# Patient Record
Sex: FEMALE | Race: WHITE | NOT HISPANIC OR LATINO | Employment: FULL TIME | URBAN - METROPOLITAN AREA
[De-identification: names, ages, dates, MRNs, and addresses within clinical notes are randomized per-mention and may not be internally consistent; named-entity substitution may affect disease eponyms.]

---

## 2017-09-06 ENCOUNTER — TRANSCRIBE ORDERS (OUTPATIENT)
Dept: ADMINISTRATIVE | Facility: HOSPITAL | Age: 42
End: 2017-09-06

## 2017-09-06 DIAGNOSIS — Z12.31 VISIT FOR SCREENING MAMMOGRAM: Primary | ICD-10-CM

## 2017-10-11 ENCOUNTER — HOSPITAL ENCOUNTER (OUTPATIENT)
Dept: MAMMOGRAPHY | Facility: HOSPITAL | Age: 42
Discharge: HOME | End: 2017-10-11
Attending: OBSTETRICS & GYNECOLOGY | Admitting: OBSTETRICS & GYNECOLOGY

## 2017-10-11 DIAGNOSIS — Z12.31 VISIT FOR SCREENING MAMMOGRAM: ICD-10-CM

## 2017-10-11 PROCEDURE — 77063 BREAST TOMOSYNTHESIS BI: CPT

## 2017-10-11 PROCEDURE — G0202 SCR MAMMO BI INCL CAD: HCPCS

## 2017-10-12 PROCEDURE — 77067 SCR MAMMO BI INCL CAD: CPT | Performed by: RADIOLOGY

## 2017-10-12 PROCEDURE — 77063 BREAST TOMOSYNTHESIS BI: CPT | Performed by: RADIOLOGY

## 2018-08-31 ENCOUNTER — TRANSCRIBE ORDERS (OUTPATIENT)
Dept: ADMINISTRATIVE | Facility: HOSPITAL | Age: 43
End: 2018-08-31

## 2018-08-31 DIAGNOSIS — Z12.31 VISIT FOR SCREENING MAMMOGRAM: Primary | ICD-10-CM

## 2018-10-17 ENCOUNTER — HOSPITAL ENCOUNTER (OUTPATIENT)
Dept: MAMMOGRAPHY | Facility: HOSPITAL | Age: 43
Discharge: HOME | End: 2018-10-17
Attending: OBSTETRICS & GYNECOLOGY | Admitting: OBSTETRICS & GYNECOLOGY

## 2018-10-17 DIAGNOSIS — Z12.31 VISIT FOR SCREENING MAMMOGRAM: ICD-10-CM

## 2018-10-17 PROCEDURE — 77063 BREAST TOMOSYNTHESIS BI: CPT

## 2018-10-17 PROCEDURE — 77067 SCR MAMMO BI INCL CAD: CPT

## 2018-10-17 PROCEDURE — 77063 BREAST TOMOSYNTHESIS BI: CPT | Performed by: RADIOLOGY

## 2018-10-17 PROCEDURE — 77067 SCR MAMMO BI INCL CAD: CPT | Performed by: RADIOLOGY

## 2019-09-26 ENCOUNTER — TRANSCRIBE ORDERS (OUTPATIENT)
Dept: ADMINISTRATIVE | Facility: HOSPITAL | Age: 44
End: 2019-09-26

## 2019-09-26 DIAGNOSIS — Z12.31 VISIT FOR SCREENING MAMMOGRAM: Primary | ICD-10-CM

## 2019-12-09 ENCOUNTER — HOSPITAL ENCOUNTER (OUTPATIENT)
Dept: MAMMOGRAPHY | Facility: HOSPITAL | Age: 44
Discharge: HOME | End: 2019-12-09
Admitting: OBSTETRICS & GYNECOLOGY

## 2019-12-09 DIAGNOSIS — Z12.31 VISIT FOR SCREENING MAMMOGRAM: ICD-10-CM

## 2019-12-09 PROCEDURE — 77067 SCR MAMMO BI INCL CAD: CPT

## 2019-12-09 PROCEDURE — 77063 BREAST TOMOSYNTHESIS BI: CPT

## 2019-12-09 PROCEDURE — 77063 BREAST TOMOSYNTHESIS BI: CPT | Performed by: RADIOLOGY

## 2019-12-09 PROCEDURE — 77067 SCR MAMMO BI INCL CAD: CPT | Performed by: RADIOLOGY

## 2020-10-09 ENCOUNTER — TRANSCRIBE ORDERS (OUTPATIENT)
Dept: ADMINISTRATIVE | Facility: HOSPITAL | Age: 45
End: 2020-10-09

## 2020-10-09 DIAGNOSIS — Z12.31 ENCOUNTER FOR SCREENING MAMMOGRAM FOR MALIGNANT NEOPLASM OF BREAST: Primary | ICD-10-CM

## 2021-01-13 ENCOUNTER — APPOINTMENT (OUTPATIENT)
Dept: MAMMOGRAPHY | Facility: HOSPITAL | Age: 46
End: 2021-01-13

## 2021-02-03 ENCOUNTER — HOSPITAL ENCOUNTER (OUTPATIENT)
Dept: MAMMOGRAPHY | Facility: HOSPITAL | Age: 46
Discharge: HOME | End: 2021-02-03
Admitting: OBSTETRICS & GYNECOLOGY

## 2021-02-03 DIAGNOSIS — Z12.31 ENCOUNTER FOR SCREENING MAMMOGRAM FOR MALIGNANT NEOPLASM OF BREAST: ICD-10-CM

## 2021-02-03 PROCEDURE — 77063 BREAST TOMOSYNTHESIS BI: CPT | Performed by: RADIOLOGY

## 2021-02-03 PROCEDURE — 77067 SCR MAMMO BI INCL CAD: CPT | Performed by: RADIOLOGY

## 2021-02-03 PROCEDURE — 77067 SCR MAMMO BI INCL CAD: CPT

## 2021-02-03 PROCEDURE — 77063 BREAST TOMOSYNTHESIS BI: CPT

## 2021-10-15 ENCOUNTER — TRANSCRIBE ORDERS (OUTPATIENT)
Dept: ADMINISTRATIVE | Facility: HOSPITAL | Age: 46
End: 2021-10-15

## 2021-10-15 DIAGNOSIS — Z12.31 VISIT FOR SCREENING MAMMOGRAM: Primary | ICD-10-CM

## 2022-02-08 ENCOUNTER — HOSPITAL ENCOUNTER (OUTPATIENT)
Dept: MAMMOGRAPHY | Facility: HOSPITAL | Age: 47
Discharge: HOME | End: 2022-02-08
Admitting: OBSTETRICS & GYNECOLOGY

## 2022-02-08 DIAGNOSIS — Z12.31 VISIT FOR SCREENING MAMMOGRAM: ICD-10-CM

## 2022-02-08 PROCEDURE — 77063 BREAST TOMOSYNTHESIS BI: CPT

## 2022-02-08 PROCEDURE — 77067 SCR MAMMO BI INCL CAD: CPT

## 2022-02-08 PROCEDURE — 77067 SCR MAMMO BI INCL CAD: CPT | Performed by: RADIOLOGY

## 2022-02-08 PROCEDURE — 77063 BREAST TOMOSYNTHESIS BI: CPT | Performed by: RADIOLOGY

## 2022-03-16 ENCOUNTER — HOSPITAL ENCOUNTER (OUTPATIENT)
Dept: MAMMOGRAPHY | Facility: HOSPITAL | Age: 47
Discharge: HOME | End: 2022-03-16
Admitting: RADIOLOGY

## 2022-03-16 DIAGNOSIS — R92.8 ABNORMAL MAMMOGRAM: ICD-10-CM

## 2022-03-16 PROCEDURE — 77065 DX MAMMO INCL CAD UNI: CPT

## 2022-03-16 PROCEDURE — 77065 DX MAMMO INCL CAD UNI: CPT | Performed by: RADIOLOGY

## 2022-03-21 ENCOUNTER — TRANSCRIBE ORDERS (OUTPATIENT)
Dept: ADMINISTRATIVE | Facility: HOSPITAL | Age: 47
End: 2022-03-21

## 2022-03-21 DIAGNOSIS — R92.8 ABNORMAL MAMMOGRAM: Primary | ICD-10-CM

## 2022-10-25 ENCOUNTER — HOSPITAL ENCOUNTER (OUTPATIENT)
Dept: MAMMOGRAPHY | Facility: HOSPITAL | Age: 47
Discharge: HOME | End: 2022-10-25
Admitting: OBSTETRICS & GYNECOLOGY

## 2022-10-25 DIAGNOSIS — R92.8 ABNORMAL MAMMOGRAM: ICD-10-CM

## 2022-10-25 PROCEDURE — 77065 DX MAMMO INCL CAD UNI: CPT | Performed by: RADIOLOGY

## 2022-10-25 PROCEDURE — 77065 DX MAMMO INCL CAD UNI: CPT

## 2022-11-08 ENCOUNTER — TRANSCRIBE ORDERS (OUTPATIENT)
Dept: ADMINISTRATIVE | Facility: HOSPITAL | Age: 47
End: 2022-11-08

## 2022-11-08 DIAGNOSIS — Z12.31 VISIT FOR SCREENING MAMMOGRAM: Primary | ICD-10-CM

## 2023-02-09 ENCOUNTER — HOSPITAL ENCOUNTER (OUTPATIENT)
Dept: MAMMOGRAPHY | Facility: HOSPITAL | Age: 48
Discharge: HOME | End: 2023-02-09
Admitting: OBSTETRICS & GYNECOLOGY
Payer: COMMERCIAL

## 2023-02-09 DIAGNOSIS — Z12.31 VISIT FOR SCREENING MAMMOGRAM: ICD-10-CM

## 2023-02-09 PROCEDURE — 77063 BREAST TOMOSYNTHESIS BI: CPT

## 2023-02-09 PROCEDURE — 77063 BREAST TOMOSYNTHESIS BI: CPT | Performed by: RADIOLOGY

## 2023-02-09 PROCEDURE — 77067 SCR MAMMO BI INCL CAD: CPT

## 2023-02-09 PROCEDURE — 77067 SCR MAMMO BI INCL CAD: CPT | Performed by: RADIOLOGY

## 2024-02-20 ENCOUNTER — HOSPITAL ENCOUNTER (OUTPATIENT)
Dept: MAMMOGRAPHY | Facility: HOSPITAL | Age: 49
Discharge: HOME | End: 2024-02-20
Admitting: OBSTETRICS & GYNECOLOGY
Payer: COMMERCIAL

## 2024-02-20 DIAGNOSIS — Z12.31 VISIT FOR SCREENING MAMMOGRAM: ICD-10-CM

## 2024-02-20 PROCEDURE — 77063 BREAST TOMOSYNTHESIS BI: CPT

## 2024-02-20 PROCEDURE — 77067 SCR MAMMO BI INCL CAD: CPT

## 2024-02-22 PROCEDURE — 77063 BREAST TOMOSYNTHESIS BI: CPT | Performed by: RADIOLOGY

## 2024-02-22 PROCEDURE — 77067 SCR MAMMO BI INCL CAD: CPT | Performed by: RADIOLOGY

## 2024-03-13 ENCOUNTER — OFFICE VISIT (OUTPATIENT)
Dept: ORTHOPEDIC SURGERY | Facility: CLINIC | Age: 49
End: 2024-03-13
Payer: COMMERCIAL

## 2024-03-13 VITALS
SYSTOLIC BLOOD PRESSURE: 130 MMHG | DIASTOLIC BLOOD PRESSURE: 86 MMHG | HEIGHT: 67 IN | BODY MASS INDEX: 31.48 KG/M2 | WEIGHT: 200.6 LBS

## 2024-03-13 DIAGNOSIS — M79.671 BILATERAL FOOT PAIN: Primary | ICD-10-CM

## 2024-03-13 DIAGNOSIS — M79.672 BILATERAL FOOT PAIN: Primary | ICD-10-CM

## 2024-03-13 RX ORDER — DEXTROAMPHETAMINE SACCHARATE, AMPHETAMINE ASPARTATE MONOHYDRATE, DEXTROAMPHETAMINE SULFATE AND AMPHETAMINE SULFATE 2.5; 2.5; 2.5; 2.5 MG/1; MG/1; MG/1; MG/1
10 CAPSULE, EXTENDED RELEASE ORAL
COMMUNITY
Start: 2024-02-26

## 2024-03-13 RX ORDER — SUMATRIPTAN 100 MG/1
TABLET, FILM COATED ORAL
COMMUNITY
Start: 2024-01-24

## 2024-03-13 NOTE — PATIENT INSTRUCTIONS
Plantar Fasciitis  What is the plantar fascia?  The plantar fascia is a web of strong bands of fibrous connective tissue beneath the skin on the bottom of the foot  This tissue travels along the arch of the foot, from the base of the heel bone (calcaneus) to the ball of the foot (the metatarsophalangeal joints)      The plantar fascia functions to support the arch of the foot, especially during the forefoot push-off phase of the gait cycle    What is plantar fasciitis?  Plantar fasciitis is the most common cause of heel pain   The pain is typically experienced at the bottom of the heel and sometimes along the arch of the foot  Other less typical pain patterns have been observed  Pain is often worsened by physical activity and improved with rest  Pain can be pronounced during the first few steps in the morning or during the first few steps after a period of rest        What causes plantar fasciitis?  Plantar fasciitis is most accurately characterized as a degenerative condition  The term degenerative is used to describe conditions that are caused by repetitive “wear and tear”   For the plantar fascia, these small “wear and tear” injuries tend to accumulate near where the connective tissue band attaches to the heel bone  The body will naturally try to heal this damaged tissue and this may produce some swelling and inflammation localized to the bottom of the heel area  The plantar fascia tissue is constantly under some degree of tension and this tension is increased with weight-bearing and high impact activities  Changes in activity level and inadequate or poorly supportive footwear may play a role in the development of plantar fasciitis although some cases develop spontaneously without an obvious explanation   Some patients with heel pain from plantar fasciitis may have bone spurs on their x-ray. Bone spurs do not cause plantar fasciitis. Many people without heel pain and no diagnosis of plantar fasciitis will have  bone spurs were they to have x-rays performed.       What is the treatment for plantar fasciitis?  Plantar fasciitis rarely needs surgery  The vast majority of patients recover normal painless function through a combination of the below nonsurgical treatment strategies  The most important thing to recognize is that it takes time to recover and that the most successful patients are those that apply the below strategies consistently over time    Nonsurgical treatment options  Temporary immobilization:  Some patients present with a plantar fascia that is very acutely inflamed and exquisitely tender to the touch. It is difficult for these patients to walk and they do so with a limp. These patients often benefit from a period of stricter rest, such as in a walking boot, to jumpstart the healing process and calm down the aggravated tissue. For these cases a boot or cast may be recommended.    Temporary activity modifications:   Avoid high impact activities such as power walking, running, using the treadmill, hiking and jumping. These activities place a lot of strain on the plantar fascia and can impede its ability to heal. Continuation of these activities through the treatment or recovery period may prolong the duration of symptoms.     Stretching exercises:  Healing of the plantar fascia tissue is dependent upon the flexibility of the calf muscle. If the calf is tight, this leads to greater tension along the plantar fascia tissue during walking and can obstruct healing. There is good evidence that a consistent calf stretching program can improve symptoms of plantar fasciitis and lead to recovery. Improving calf and Achilles tendon flexibility does not happen overnight, however. It can take 6-12 weeks of routine stretching before improvements are realized. Stretching and massage techniques along the plantar fascia in the arch of the foot can also be tried with ice and rollers.     Night splinting:  Many patients share  "that pain is the worst in the mornings with the first few steps of the day. This is probably due to the way the ankle rests in plantarflexion (toes pointed down) during sleep. A plantarflexed ankle leads to a contracted or tight position for the calf and plantar fascia tissue. There is some evidence to suggest that keeping the ankle in a neutral (90-degree) position overnight can help alleviate some of the morning symptoms. This can be accomplished with a night splint. These can be searched for and purchased on Amazon for $20-40.     Go to Amazon.com and type in \"Plantar Fasciitis Night Splint\"    Orthotics:  In general, a well cushioned soft orthotic or insert will be better tolerated when the bottom of the heel is sore. It is recommended that this orthotic or insert also include some form of arch support. Gel heel cups can also be tried to further cushion the painful heel area.         Footwear modifications:  Patients prefer shoes with a thicker and more rigid supportive sole. Shoes with a rocker-bottom style sole are helpful because the foot rolls with the shoe on the walking surface. This type of sole limits the need for the forefoot to push-off and this decreases the demands and strain on the plantar fascia tissue. Many footwear companies offer this style now. [employer]® is one such brand.       Anti-inflammatory medications:  Non-steroidal anti-inflammatory medications (NSAIDs) may be tried. There are over-the-counter and prescription options. These are typically taken by mouth, but there are also topical formulations. Plantar fasciitis is not considered an inflammatory condition and so the use of these medications in isolation will rarely solve the underlying problem. Rather, these medications can help with pain while other, more effective strategies like stretching, are employed. Some patients have medical conditions that limit their ability to take NSAIDs.           Plantar Fasciitis Rehabilitation " Stretches/Exercises:     YOU MUST DO:   5 REPETITIONS          6-8 TIMES PER DAY            FOR 4 MONTHS     Cross the injured leg over other leg. While placing fingers along base of toes, pull toes back toward shin until stretch felt in plantar fascia.           Stand with the ball of the injured foot on a stair. Reach for the bottom step with your heel until stretching felt in arch of foot. Hold this position for 30-60 seconds then relax and repeat.        Standing calf stretch - While facing a wall, put your hands against the wall at eye level. Keep injured leg back and uninjured leg forward while keeping the heel of the injured leg on the floor. Turn injured foot slightly inward while slowly leaning inward until you feel the back of your calf on the injured leg stretch. Repeat 3 times.        Towel Stretch - Sit on a hard surface with your injured leg stretched out. Wrap a towel around the foot of the stretched out leg and pull the towel toward your body, stretching the back of the calf for 30 seconds. Repeat 3 times.        Frozen can roll - Roll bare injured foot back and forth from heel to mid-arch over a frozen juice can. Repeat for 3-5 minutes. This exercise is particularly useful in the morning.        Sitting toe raise - Sit in a chair with your feet flat on the floor. Raise the toes and ball of injured foot while keeping heel on the floor. Hold for 5 seconds, repeat 10 times and perform 3 sets of 10.        Towel pickup - With your heel on the ground,  a towel with your toes and release. Repeat 10-20 times.    Resources:  Cheli BF, Christiano DA, Blair DP, Zully PA, Ruperto TT, Lily GE, Sandra JIMÉNEZ. Plantar fascia-specific stretching exercise improves outcomes in patients with chronic plantar fasciitis. A prospective clinical trial with two-year follow-up. J Bone Joint Surg Am. 2006 Aug;88(8):1775-81.  Marco CJ, Christiano D, Elaine BP. Correlation Between Gastrocnemius Tightness and Heel Pain  Severity in Plantar Fasciitis. Foot Ankle Int. 2021 Jan;42(1):76-82.  Some images were obtained from the American Academy of Orthopedic Surgery's patient education resource called OrthoInfo. Additional information can be found at www.orthoinfo.org. Search “plantar fasciitis”.   Yun FRIEND, Axel STEVE (1999) Plantar fasciitis: etiology and treatment. Journal of Orthopaedic & Sports Physical Therapy 29, 756-088  [place] Special Foot Exercises [Internet]. [facility]. Available from [email or web address]-foot-exercises     [employer]                  Online:  [email or web address]  [email or web address]  [employer]    [place], [place]:   [employer]   [address], [address], [facility] [address]   [email or web address]      [email or web address]   [address], [facility], [address]   [email or web address]      [facility]   [address] [place] [address]   [email or web address]      [employer] [facility]   [address] , [address], [place] [address]   [email or web address]     [employer]  [address] [place] [address]  [email or web address]  P: [phone]    Rigid insole, rocker bottom

## 2024-03-13 NOTE — PROGRESS NOTES
[facility] [place] [facility] Office Visit     Office Visit       Date: 03/13/2024   Patient Name: Renee Paulino  MRN: [id number]  YOB: 1975    Referring Physician: Referring, Self     Chief Complaint:   Chief Complaint   Patient presents with    Right Foot - Pain       History of Present Illness: Renee Paulino is a 48 y.o. female who is here today for bilateral foot pain.  Describes right heel pain that is worse with few steps after getting up as well as after working for several hours on her feet.  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  States also having some pain lateral left forefoot over the distal fourth and fifth metatarsals.  States has been going on for years.  No specific injury.  Pain described as aching and stabbing.  Increased activity makes her pain worse and so it is getting up after being seated for the first few steps.  Has tried rest and issues new orthotics which have helped with her symptoms.  She states her problem has worsened since it started.  Patient denies smoking.  No other complaints.    Subjective   Review of Systems: Review of Systems   Constitutional: Negative.    HENT: Negative.     Eyes: Negative.    Respiratory: Negative.     Cardiovascular: Negative.    Gastrointestinal: Negative.    Endocrine: Negative.    Genitourinary: Negative.    Musculoskeletal:  Positive for arthralgias.   Skin: Negative.    Allergic/Immunologic: Negative.    Neurological: Negative.    Hematological: Negative.    Psychiatric/Behavioral: Negative.          Past Medical History: History reviewed. No pertinent past medical history.    Past Surgical History:   Past Surgical History:   Procedure Laterality Date    AUGMENTATION MAMMAPLASTY         Family History:   Family History   Problem Relation Age of Onset    Stroke Father     Ovarian cancer Maternal Grandmother     Breast cancer Maternal Aunt   "       late 40's early 50's       Social History:   Social History     Socioeconomic History    Marital status:    Tobacco Use    Smoking status: Never     Passive exposure: Never    Smokeless tobacco: Never   Vaping Use    Vaping status: Never Used   Substance and Sexual Activity    Alcohol use: Yes     Comment: occasional    Drug use: Defer    Sexual activity: Defer       Medications:   Current Outpatient Medications:     amitriptyline (ELAVIL) 10 MG tablet, As Needed., Disp: , Rfl:     amphetamine-dextroamphetamine XR (ADDERALL XR) 10 MG 24 hr capsule, Take 1 capsule by mouth, Disp: , Rfl:     levothyroxine (SYNTHROID, LEVOTHROID) 75 MCG tablet, Synthroid 75 mcg tablet, Disp: , Rfl:     rosuvastatin (CRESTOR) 10 MG tablet, Take 1 tablet by mouth., Disp: , Rfl:     SUMAtriptan (IMITREX) 100 MG tablet, , Disp: , Rfl:     Allergies: No Known Allergies    I reviewed the patient's chief complaint, history of present illness, review of systems, past medical history, surgical history, family history, social history, medications and allergy list.     Objective    Vital Signs:   Vitals:    03/13/24 0816   BP: 130/86   Weight: 91 kg (200 lb 9.6 oz)   Height: 170.2 cm (67\")     Body mass index is 31.42 kg/m².    Ortho Exam:  Bilateral LE Foot and Ankle Exam:   Plantigrade feet  There is good perfusion to the toes bilaterally.   The skin is intact throughout the foot and ankle bilaterally.  Range of motion of ankle, foot and toes is within normal limits bilaterally.   There is tenderness to palpation posterior medial heel at the plantar fascia insertion site as well as slightly more posterior which I still think correlates with some of the plantar fascia insertion.  Not tender over the Achilles insertion.  Patient has positive Silfverskiold test bilaterally.  Patient has tenderness to palpation of the distal dorsal metatarsals 4 and 5 of her left foot.    Results Review:   Imaging Results (Last 24 Hours)       " Procedure Component Value Units Date/Time    XR Foot 3+ View Bilateral [[id number]] Resulted: 03/13/24 0826     Updated: 03/13/24 0829    Narrative:      Bilateral Foot X-Ray 03/13/24   Indication: Pain  Views: 3 weight bearing , comparison none  Findings: xrays reviewed by me today in the office and show bilateral pes   planus, no acute osseous abnormality              Assessment / Plan    Assessment/Plan:   Diagnoses and all orders for this visit:    1. Bilateral foot pain (Primary)  -     XR Foot 3+ View Bilateral      Discussed bilateral foot pain which has been present for over a year causing pain that has progressed (a chronic illness with exacerbation). Decision regarding surgical intervention considered. Patient is not a candidate due to trial of nonoperative management.  Patient seems to be displaying signs of Planter fasciitis on the right heel as well as metatarsalgia/stress reaction left forefoot.  Spent a long time discussing with patient activity modifications to help offload feet and allow them a chance to heal.  I think that her pain is definitely due to wear and tear with her job.  Provided patient with Planter fasciitis educational handout and stressed to her the importance of doing stretching exercises as well as wearing a night splint as well as avoiding activities that cause pain.  With regard to her left foot I provided information on accommodative shoe wear with rigid insoles and rocker-bottom's.  Also counseled her to discuss getting her orthotics adjusted and order made to include a rigid component with possible metatarsal pad especially to help offload those distal metatarsals on the left foot.  Patient counseled to try these conservative treatment modalities for the next several months and return to clinic on an as-needed basis if symptoms persist or worsen.  If symptoms do persist or worsen we will further discuss possible steroid injection for the plantar fasciitis versus nonweightbearing  in a short leg cast.  Was a pleasure seeing her today.    Follow Up:   No follow-ups on file.      Gunnar Mckenzie MD  [facility] [place] [facility]

## 2025-01-13 ENCOUNTER — TRANSCRIBE ORDERS (OUTPATIENT)
Dept: ADMINISTRATIVE | Facility: HOSPITAL | Age: 50
End: 2025-01-13
Payer: COMMERCIAL

## 2025-01-13 DIAGNOSIS — Z12.31 BREAST CANCER SCREENING BY MAMMOGRAM: Primary | ICD-10-CM

## 2025-02-25 ENCOUNTER — HOSPITAL ENCOUNTER (OUTPATIENT)
Dept: MAMMOGRAPHY | Facility: HOSPITAL | Age: 50
Discharge: HOME | End: 2025-02-25
Admitting: OBSTETRICS & GYNECOLOGY
Payer: COMMERCIAL

## 2025-02-25 DIAGNOSIS — Z12.31 BREAST CANCER SCREENING BY MAMMOGRAM: ICD-10-CM

## 2025-02-25 LAB
NCCN CRITERIA FLAG: NORMAL
TYRER CUZICK SCORE: 17

## 2025-02-25 PROCEDURE — 77067 SCR MAMMO BI INCL CAD: CPT

## 2025-02-25 PROCEDURE — 77063 BREAST TOMOSYNTHESIS BI: CPT
